# Patient Record
Sex: FEMALE | Race: WHITE | URBAN - METROPOLITAN AREA
[De-identification: names, ages, dates, MRNs, and addresses within clinical notes are randomized per-mention and may not be internally consistent; named-entity substitution may affect disease eponyms.]

---

## 2022-07-08 ENCOUNTER — OFFICE VISIT (OUTPATIENT)
Dept: URBAN - METROPOLITAN AREA CLINIC 71 | Facility: CLINIC | Age: 37
End: 2022-07-08
Payer: COMMERCIAL

## 2022-07-08 DIAGNOSIS — H04.123 DRY EYE SYNDROME OF BILATERAL LACRIMAL GLANDS: Primary | ICD-10-CM

## 2022-07-08 DIAGNOSIS — H52.4 PRESBYOPIA: ICD-10-CM

## 2022-07-08 DIAGNOSIS — H52.13 MYOPIA, BILATERAL: ICD-10-CM

## 2022-07-08 PROCEDURE — 92004 COMPRE OPH EXAM NEW PT 1/>: CPT

## 2022-07-08 ASSESSMENT — VISUAL ACUITY
OS: 20/20
OD: 20/20

## 2022-07-08 ASSESSMENT — INTRAOCULAR PRESSURE
OS: 16
OD: 18

## 2022-07-08 NOTE — IMPRESSION/PLAN
Impression: Myopia, bilateral: H52.13. minimal Rx for distance. Patient feels she is needing a little bit of a reader. Plan: Dispensed new glasses Rx today and discussed changes and possible adaptation period. Patient to call if any issues with new glasses occur.